# Patient Record
Sex: FEMALE | Race: WHITE | Employment: UNEMPLOYED | URBAN - METROPOLITAN AREA
[De-identification: names, ages, dates, MRNs, and addresses within clinical notes are randomized per-mention and may not be internally consistent; named-entity substitution may affect disease eponyms.]

---
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
## 2024-12-18 ENCOUNTER — OFFICE VISIT (OUTPATIENT)
Dept: URGENT CARE | Age: 19
End: 2024-12-18
Payer: COMMERCIAL

## 2024-12-18 VITALS
TEMPERATURE: 98.4 F | DIASTOLIC BLOOD PRESSURE: 81 MMHG | OXYGEN SATURATION: 98 % | HEART RATE: 78 BPM | SYSTOLIC BLOOD PRESSURE: 122 MMHG | RESPIRATION RATE: 16 BRPM

## 2024-12-18 DIAGNOSIS — N89.8 VAGINAL IRRITATION: ICD-10-CM

## 2024-12-18 LAB
CHLAMYDIA TRACHOMATIS: NOT DETECTED
NEISSERIA GONORRHOEAE: NOT DETECTED
POC APPEARANCE, URINE: CLEAR
POC BILIRUBIN, URINE: NEGATIVE
POC BLOOD, URINE: NEGATIVE
POC COLOR, URINE: YELLOW
POC GLUCOSE, URINE: NEGATIVE MG/DL
POC KETONES, URINE: NEGATIVE MG/DL
POC LEUKOCYTES, URINE: ABNORMAL
POC NITRITE,URINE: NEGATIVE
POC PH, URINE: 6.5 PH
POC PROTEIN, URINE: NEGATIVE MG/DL
POC SPECIFIC GRAVITY, URINE: 1.01
POC UROBILINOGEN, URINE: 0.2 EU/DL
PREGNANCY TEST URINE, POC: NEGATIVE
TRICHOMONAS VAGINA: NOT DETECTED

## 2024-12-18 PROCEDURE — 81003 URINALYSIS AUTO W/O SCOPE: CPT | Performed by: PHYSICIAN ASSISTANT

## 2024-12-18 PROCEDURE — 81025 URINE PREGNANCY TEST: CPT | Performed by: PHYSICIAN ASSISTANT

## 2024-12-18 PROCEDURE — 87086 URINE CULTURE/COLONY COUNT: CPT

## 2024-12-18 PROCEDURE — 99204 OFFICE O/P NEW MOD 45 MIN: CPT | Performed by: PHYSICIAN ASSISTANT

## 2024-12-18 PROCEDURE — 87801 DETECT AGNT MULT DNA AMPLI: CPT | Performed by: PHYSICIAN ASSISTANT

## 2024-12-18 PROCEDURE — 87205 SMEAR GRAM STAIN: CPT

## 2024-12-18 RX ORDER — FLUCONAZOLE 150 MG/1
150 TABLET ORAL SEE ADMIN INSTRUCTIONS
Qty: 2 TABLET | Refills: 0 | Status: SHIPPED | OUTPATIENT
Start: 2024-12-18 | End: 2024-12-19

## 2024-12-18 ASSESSMENT — ENCOUNTER SYMPTOMS
ABDOMINAL PAIN: 0
FREQUENCY: 0
HEMATURIA: 0
NAUSEA: 0
DYSURIA: 0
FEVER: 0
CHILLS: 0

## 2024-12-18 NOTE — PROGRESS NOTES
Subjective   Patient ID: Rianna Harris is a 19 y.o. female. They present today with a chief complaint of vaginal irritation (C/o vaginal itching, irritation, redness/Used monistat last night and felt a little better, wants STD check).    History of Present Illness  Presents today for vaginal irritation it's an itching. She states that her symptoms first started on Monday. She did use over the counter monistat which helped a bit with the itching but it did cause a little irritation and redness. Patient denies any painful urination increase urinary frequency or urgency, vaginal discharge, vaginal odor, abdominal or back pain, fevers, chills, nausea or vomiting. LMP: 2 weeks ago. No history of STI          Past Medical History  Allergies as of 12/18/2024    (No Known Allergies)       (Not in a hospital admission)       No past medical history on file.    No past surgical history on file.         Review of Systems  Review of Systems   Constitutional:  Negative for chills and fever.   Gastrointestinal:  Negative for abdominal pain and nausea.   Genitourinary:  Positive for vaginal pain. Negative for dysuria, frequency, genital sores, hematuria, pelvic pain, urgency and vaginal discharge.                                  Objective    Vitals:    12/18/24 1749   BP: 122/81   Pulse: 78   Resp: 16   Temp: 36.9 °C (98.4 °F)   TempSrc: Temporal   SpO2: 98%     No LMP recorded.    Physical Exam  Vitals reviewed.   Constitutional:       General: She is not in acute distress.  Cardiovascular:      Rate and Rhythm: Normal rate and regular rhythm.      Heart sounds: Normal heart sounds.   Pulmonary:      Effort: Pulmonary effort is normal. No respiratory distress.      Breath sounds: Normal breath sounds.   Abdominal:      General: Abdomen is flat. Bowel sounds are normal.      Palpations: Abdomen is soft.      Tenderness: There is no abdominal tenderness.   Genitourinary:     Comments: Declined, patient self  swabs        Procedures    Point of Care Test & Imaging Results from this visit  Results for orders placed or performed in visit on 12/18/24   POCT STI PCR (GC,TRICH) manually resulted   Result Value Ref Range    Chlamydia Trachomatis Not Detected Not Detected    Neisseria Gonorrhoeae Not Detected Not Detected    Trichomonas Vagina Not Detected Not Detected   POCT UA Automated manually resulted   Result Value Ref Range    POC Color, Urine Yellow Straw, Yellow, Light-Yellow    POC Appearance, Urine Clear Clear    POC Glucose, Urine NEGATIVE NEGATIVE mg/dl    POC Bilirubin, Urine NEGATIVE NEGATIVE    POC Ketones, Urine NEGATIVE NEGATIVE mg/dl    POC Specific Gravity, Urine 1.010 1.005 - 1.035    POC Blood, Urine NEGATIVE NEGATIVE    POC PH, Urine 6.5 No Reference Range Established PH    POC Protein, Urine NEGATIVE NEGATIVE, 30 (1+) mg/dl    POC Urobilinogen, Urine 0.2 0.2, 1.0 EU/DL    Poc Nitrite, Urine NEGATIVE NEGATIVE    POC Leukocytes, Urine LARGE (3+) (A) NEGATIVE   POCT pregnancy, urine manually resulted   Result Value Ref Range    Preg Test, Ur Negative Negative      No results found.    Diagnostic study results (if any) were reviewed by Janet Duncan PA-C.    Assessment/Plan   Allergies, medications, history, and pertinent labs/EKGs/Imaging reviewed by Janet Duncan PA-C.     Medical Decision Making  MDM- Signs and symptoms consistent with vaginitis. History and examination do not support evidence of PID, or retained vaginal foreign body. Urine HCG negative. Urinalysis did show 3+ leukocytes; urine was sent for culture. Given she denied any urinary symptoms we will await culture report and treat if abnormal  She was also negative for gonorrhea, chlamydia and trich.    Will treat with fluconazole for suspected yeast; cultures pending. Encourage patient to return to clinic or present to ED if symptoms change or worsen. Otherwise follow up with PCP or Gynecology. Patient verbalized understanding and agrees  with plan.      Orders and Diagnoses  Diagnoses and all orders for this visit:  Vaginal irritation  -     POCT STI PCR (GC,TRICH) manually resulted  -     POCT UA Automated manually resulted  -     Urine Culture  -     POCT pregnancy, urine manually resulted  -     fluconazole (Diflucan) 150 mg tablet; Take 1 tablet (150 mg) by mouth see administration instructions for 1 day. Take one tab now. Repeat in 7 days if symptoms persist.      Medical Admin Record      Patient disposition: Home    Electronically signed by Janet Duncan PA-C  6:26 PM

## 2024-12-19 LAB
BACTERIAL VAGINOSIS VAG-IMP: NORMAL
CLUE CELLS VAG LPF-#/AREA: NORMAL /[LPF]
NUGENT SCORE: 4
YEAST VAG WET PREP-#/AREA: NORMAL

## 2024-12-20 LAB — BACTERIA UR CULT: NORMAL

## 2025-08-21 ENCOUNTER — OFFICE VISIT (OUTPATIENT)
Dept: URGENT CARE | Age: 20
End: 2025-08-21
Payer: COMMERCIAL

## 2025-08-21 VITALS
HEART RATE: 74 BPM | SYSTOLIC BLOOD PRESSURE: 113 MMHG | RESPIRATION RATE: 16 BRPM | OXYGEN SATURATION: 98 % | TEMPERATURE: 98.2 F | DIASTOLIC BLOOD PRESSURE: 72 MMHG

## 2025-08-21 DIAGNOSIS — Z20.2 POSSIBLE EXPOSURE TO STI: Primary | ICD-10-CM

## 2025-08-21 DIAGNOSIS — N30.01 ACUTE CYSTITIS WITH HEMATURIA: ICD-10-CM

## 2025-08-21 LAB
CHLAMYDIA TRACHOMATIS: NOT DETECTED
NEISSERIA GONORRHOEAE: NOT DETECTED
POC APPEARANCE, URINE: ABNORMAL
POC BACTERIAL VAGINITIS (RAPID): NEGATIVE
POC BILIRUBIN, URINE: NEGATIVE
POC BLOOD, URINE: ABNORMAL
POC COLOR, URINE: YELLOW
POC GLUCOSE, URINE: NEGATIVE MG/DL
POC KETONES, URINE: NEGATIVE MG/DL
POC LEUKOCYTES, URINE: ABNORMAL
POC NITRITE,URINE: NEGATIVE
POC PH, URINE: 7 PH
POC PROTEIN, URINE: NEGATIVE MG/DL
POC SPECIFIC GRAVITY, URINE: 1.01
POC TRICHOMONAS: NEGATIVE
POC UROBILINOGEN, URINE: 0.2 EU/DL

## 2025-08-21 PROCEDURE — 81003 URINALYSIS AUTO W/O SCOPE: CPT

## 2025-08-21 PROCEDURE — 87808 TRICHOMONAS ASSAY W/OPTIC: CPT

## 2025-08-21 PROCEDURE — 99214 OFFICE O/P EST MOD 30 MIN: CPT

## 2025-08-21 PROCEDURE — 1036F TOBACCO NON-USER: CPT

## 2025-08-21 PROCEDURE — 87905 IA NZMTC ACTV OTH/THN VIRUS: CPT

## 2025-08-21 PROCEDURE — 87801 DETECT AGNT MULT DNA AMPLI: CPT

## 2025-08-21 RX ORDER — SULFAMETHOXAZOLE AND TRIMETHOPRIM 800; 160 MG/1; MG/1
1 TABLET ORAL 2 TIMES DAILY
Qty: 14 TABLET | Refills: 0 | Status: SHIPPED | OUTPATIENT
Start: 2025-08-21